# Patient Record
Sex: MALE | Race: WHITE | NOT HISPANIC OR LATINO | URBAN - METROPOLITAN AREA
[De-identification: names, ages, dates, MRNs, and addresses within clinical notes are randomized per-mention and may not be internally consistent; named-entity substitution may affect disease eponyms.]

---

## 2021-10-08 ENCOUNTER — EMERGENCY (EMERGENCY)
Facility: HOSPITAL | Age: 26
LOS: 1 days | Discharge: HOME | End: 2021-10-08
Admitting: EMERGENCY MEDICINE
Payer: COMMERCIAL

## 2021-10-08 VITALS
TEMPERATURE: 98 F | OXYGEN SATURATION: 99 % | SYSTOLIC BLOOD PRESSURE: 119 MMHG | WEIGHT: 149.91 LBS | HEART RATE: 79 BPM | DIASTOLIC BLOOD PRESSURE: 81 MMHG | RESPIRATION RATE: 16 BRPM

## 2021-10-08 VITALS
SYSTOLIC BLOOD PRESSURE: 110 MMHG | TEMPERATURE: 98 F | RESPIRATION RATE: 16 BRPM | HEART RATE: 72 BPM | OXYGEN SATURATION: 97 % | DIASTOLIC BLOOD PRESSURE: 74 MMHG

## 2021-10-08 DIAGNOSIS — I86.1 SCROTAL VARICES: ICD-10-CM

## 2021-10-08 DIAGNOSIS — M54.50 LOW BACK PAIN, UNSPECIFIED: ICD-10-CM

## 2021-10-08 DIAGNOSIS — N50.812 LEFT TESTICULAR PAIN: ICD-10-CM

## 2021-10-08 PROCEDURE — 76870 US EXAM SCROTUM: CPT | Mod: 26

## 2021-10-08 PROCEDURE — 99284 EMERGENCY DEPT VISIT MOD MDM: CPT

## 2021-10-08 NOTE — ED PROVIDER NOTE - CARE PROVIDER_API CALL
Larry Alfonso)  Urology  [address] [address]  Phone: [phone]  Fax: [phone]  Follow Up Time:     Myke Morin)  Urology  [address], [facility] [place] [address]  Phone: [phone]  Fax: [phone]  Follow Up Time:

## 2021-10-08 NOTE — ED PROVIDER NOTE - NSFOLLOWUPINSTRUCTIONS_ED_ALL_ED_FT
Please follow up with urology    Return for any concerns    Take Ibuprofen 600mg every 6-8 hours as needed for pain, take with food, and in addition you may take Tylenol 500 mg every 6-8 hours as needed for pain      A varicocele is a condition that causes the veins in your scrotum to become enlarged and dilated. The scrotum is the sac that holds the testicles. A varicocele can cause infertility because it prevents sperm from flowing out of the testicles.    DISCHARGE INSTRUCTIONS:    Medicines:   •NSAIDs, such as ibuprofen, help decrease swelling, pain, and fever. This medicine is available with or without a doctor's order. NSAIDs can cause stomach bleeding or kidney problems in certain people. If you take blood thinner medicine, always ask your healthcare provider if NSAIDs are safe for you. Always read the medicine label and follow directions.      •Take your medicine as directed. Contact your healthcare provider if you think your medicine is not helping or if you have side effects. Tell him or her if you are allergic to any medicine. Keep a list of the medicines, vitamins, and herbs you take. Include the amounts, and when and why you take them. Bring the list or the pill bottles to follow-up visits. Carry your medicine list with you in case of an emergency.      Wear an athletic supporter: An athletic supporter may reduce pressure and treat your varicocele.    Follow up with your healthcare provider as directed: Write down your questions so you remember to ask them during your visits.     Contact your healthcare provider if:   •You have pain in your scrotum.      •You have a lump on your scrotum.      •You have questions or concerns about your condition or care.

## 2021-10-08 NOTE — ED PROVIDER NOTE - PATIENT PORTAL LINK FT
You can access the FollowMyHealth Patient Portal offered by [facility] by registering at the following website: [email or web address]. By joining [employer]’s FollowMyHealth portal, you will also be able to view your health information using other applications (apps) compatible with our system.

## 2021-10-08 NOTE — ED PROVIDER NOTE - CLINICAL SUMMARY MEDICAL DECISION MAKING FREE TEXT BOX
Patient presents with 1.5 weeks of left testicle pain, left groin pain and left lower back pain. Patient had an unremarkable blood test at [facility] earlier today and was told to come to the ED. Patient appears well, and is in no acute distress.  exam within normal limits. Patient presents with 1.5 weeks of left testicle pain, left groin pain and left lower back pain. Patient had an unremarkable blood test at [facility] earlier today and was told to come to the ED. Patient appears well, and is in no acute distress.  exam within normal limits. Will get US. left testicular discomfort, had unremarkable UA at , US shows flow bilateral, varicocele to left testis, advised otc pain meds, scrotal support, f/u urology, retrn precautions discussed, will dc.

## 2021-10-08 NOTE — ED PROVIDER NOTE - OBJECTIVE STATEMENT
24 y/o male with no PMHx presents to the ED complaining of 1.5 weeks of left lower back pain radiating to the ribs and groin. Patient also complains of a pain in the "vein leading up to the left testicle." He states that 2 days ago he ejaculated and 1 minute later had left testicular pain. Patient states that he then went to sleep and in the morning felt 95% better. He states his lower back is mildly sore. He states that he recently began exercising for the 1st time in 3 weeks recently and that he initially thought that this was the cause of his back pain. Patient went to [facility] this morning for evaluation, had an unremarkable blood test and was told to come to the ED for further evaluation. Patient also states that 3 months ago, he had gone on a long run (which was unusual for him) and afterwards experienced blood in his semen. Patient then went to urgent care then and had an STI test which came back negative. Patient states that he has been having intercourse with the same partner throughout this time. Patient states his pain is currently very mild, and that he only has mild urinary discomfort. Patient denies fever, chills, hematuria, nausea, vomiting, cough and chest pain. Patient has no PMHx of kidney stones.    Blood work results from [facility]:  BLD - neg  UBG - 2  JASS - neg  PRO - 30  NIT - neg  KET - neg  GLU - neg  pH - 7  SG - 1.010  ALVARADO - Neg 26 y/o male with no PMHx presents to the ED c/o left testicular discomfort over the past few days. also c/o complains of a pain in the "vein leading up to the left testicle." He states that 2 days ago he ejaculated and 1 minute later had left testicular pain. Patient states that he then went to sleep and in the morning felt 95% better. He states his lower back is mildly sore. He states that he recently began exercising for the 1st time in 3 weeks recently and that he thought that this was the cause of his back pain. Patient went to [facility] this morning for evaluation, had UA (see below for results) and was told to come to the ED for further evaluation. Patient also states that 3 months ago, he had gone on a long run (which was unusual for him) and afterwards experienced blood in his semen. Patient then went to urgent care then and had an STI test which came back negative. Patient states that he has been having intercourse with the same partner throughout this time. Patient states his pain is currently very mild. denies fever/dysuria/urinary freqiency. Patient denies fever, chills, hematuria, nausea, vomiting.  no hx of kidney stones.    UA results from [facility]:  BLD - neg  UBG - 2  JASS - neg  PRO - 30  NIT - neg  KET - neg  GLU - neg  pH - 7  SG - 1.010  ALVARADO - Neg

## 2021-10-08 NOTE — ED PROVIDER NOTE - CARE PROVIDERS DIRECT ADDRESSES
[email or web address]

## 2021-10-08 NOTE — ED ADULT TRIAGE NOTE - CHIEF COMPLAINT QUOTE
Patient c/o lower left back pain that radiates to his left testicle x 1 week, associated with painful ejaculation 2 days ago

## 2021-10-08 NOTE — ED PROVIDER NOTE - PHYSICAL EXAMINATION
VITAL SIGNS: I have reviewed nursing notes and confirm.  CONSTITUTIONAL: Well-developed; well-nourished; in no acute distress.  SKIN: Skin is warm and dry, no acute rash.  HEAD: Normocephalic; atraumatic.  EYES: PERRL, EOM intact; conjunctiva and sclera clear.  ENT: No nasal discharge; airway clear.  NECK: Supple; non tender.  CARD: S1, S2 normal; no murmurs, gallops, or rubs. Regular rate and rhythm.  RESP: No wheezes, rales or rhonchi.  ABD: Normal bowel sounds; soft; non-distended; non-tender; no hepatosplenomegaly.  EXT: Normal ROM. No clubbing, cyanosis or edema.  NEURO: Alert, oriented. Grossly unremarkable.  PSYCH: Cooperative, appropriate.   : No discharge, lesions. VITAL SIGNS: I have reviewed nursing notes and confirm.  CONSTITUTIONAL: Well-developed; well-nourished; in no acute distress.  SKIN: Skin is warm and dry, no acute rash.  HEAD: Normocephalic; atraumatic.  EYES: PERRL, EOM intact; conjunctiva and sclera clear.  ENT: No nasal discharge; airway clear.  NECK: Supple; non tender.  CARD: S1, S2 normal; no murmurs, gallops, or rubs. Regular rate and rhythm.  RESP: No wheezes, rales or rhonchi.  ABD: Normal bowel sounds; soft; non-distended; non-tender; no hepatosplenomegaly.  EXT: Normal ROM. No clubbing, cyanosis or edema.  NEURO: Alert, oriented. Grossly unremarkable.  PSYCH: Cooperative, appropriate.   : (Exam chaperoned by Miguel Maradiaga) No discharge, lesions. Testicles with no enlargement bilaterally. VITAL SIGNS: I have reviewed nursing notes and confirm.  CONSTITUTIONAL: Well-developed; well-nourished; in no acute distress.  SKIN: Skin is warm and dry, no acute rash.  HEAD: Normocephalic; atraumatic.  EYES: PERRL, EOM intact; conjunctiva and sclera clear.  ENT: No nasal discharge; airway clear.  CARD: S1, S2 normal; no murmurs, gallops, or rubs. Regular rate and rhythm.  RESP: No wheezes, rales or rhonchi.  ABD: Normal bowel sounds; soft; non-distended; non-tender; no hepatosplenomegaly.  EXT: Normal ROM. No clubbing, cyanosis or edema.  NEURO: Alert, oriented. Grossly unremarkable.  PSYCH: Cooperative, appropriate.   : (Exam chaperoned by Miguel Maradiaga) No discharge, lesions. Testicles with no enlargement bilaterally. VITAL SIGNS: I have reviewed nursing notes and confirm.  CONSTITUTIONAL: Well-developed; well-nourished; in no acute distress.  SKIN: Skin is warm and dry, no acute rash.  HEAD: Normocephalic; atraumatic.  EYES: PERRL, EOM intact; conjunctiva and sclera clear.  ENT: No nasal discharge; airway clear.  EXT: Normal ROM. No clubbing, cyanosis or edema.  NEURO: Alert, oriented. Grossly unremarkable.  PSYCH: Cooperative, appropriate.   : (Exam chaperoned by Miguel Maradiaga) No discharge, lesions or sores. Testicles with normal appearance, no swelling, no erythema, no tenderness, +cremasteric reflexes bilaterally. VITAL SIGNS: I have reviewed nursing notes and confirm.  CONSTITUTIONAL: Well-developed; well-nourished; in no acute distress.  SKIN: Skin is warm and dry, no acute rash.  HEAD: Normocephalic; atraumatic.  EYES:  clear.  ENT: airway clear.  EXT: Normal ROM x 4  NEURO: Alert, oriented. Grossly unremarkable.  PSYCH: Cooperative, appropriate.  Abdomen soft nontender nondistended. no cvat bilaterally  : Chaperone scribe Randall Rangachar present. normal appearance. normal lay of testes. No discharge, lesions or sores, no swelling, no erythema, no tenderness, +cremasteric reflexes bilaterally.